# Patient Record
Sex: FEMALE | URBAN - METROPOLITAN AREA
[De-identification: names, ages, dates, MRNs, and addresses within clinical notes are randomized per-mention and may not be internally consistent; named-entity substitution may affect disease eponyms.]

---

## 2022-12-03 ENCOUNTER — OFFICE VISIT (OUTPATIENT)
Dept: URBAN - METROPOLITAN AREA CLINIC 3 | Facility: CLINIC | Age: 42
End: 2022-12-03
Payer: COMMERCIAL

## 2022-12-03 DIAGNOSIS — H18.623 KERATOCONUS, UNSTABLE, BILATERAL: Primary | ICD-10-CM

## 2022-12-03 DIAGNOSIS — H43.313 VITREOUS MEMBRANES AND STRANDS, BILATERAL: ICD-10-CM

## 2022-12-03 DIAGNOSIS — H11.153 PINGUECULA, BILATERAL: ICD-10-CM

## 2022-12-03 PROCEDURE — 92025 CPTRIZED CORNEAL TOPOGRAPHY: CPT | Performed by: OPTOMETRIST

## 2022-12-03 PROCEDURE — 99204 OFFICE O/P NEW MOD 45 MIN: CPT | Performed by: OPTOMETRIST

## 2022-12-03 ASSESSMENT — INTRAOCULAR PRESSURE
OS: 3
OD: 5

## 2022-12-03 NOTE — IMPRESSION/PLAN
Impression: Keratoconus, unstable, bilateral: J21.729. Plan: Confirmed with Pentacam , which shows posterior steepening Advised no rubbing. Refer for corneal crosslinking and repeat Pentacam and proceed with CXL if there is any progression.

## 2022-12-21 ENCOUNTER — OFFICE VISIT (OUTPATIENT)
Dept: URBAN - METROPOLITAN AREA CLINIC 3 | Facility: CLINIC | Age: 42
End: 2022-12-21
Payer: COMMERCIAL

## 2022-12-21 DIAGNOSIS — H43.313 VITREOUS MEMBRANES AND STRANDS, BILATERAL: ICD-10-CM

## 2022-12-21 DIAGNOSIS — H18.623 KERATOCONUS, UNSTABLE, BILATERAL: Primary | ICD-10-CM

## 2022-12-21 PROCEDURE — 92014 COMPRE OPH EXAM EST PT 1/>: CPT | Performed by: OPHTHALMOLOGY

## 2022-12-21 PROCEDURE — 92025 CPTRIZED CORNEAL TOPOGRAPHY: CPT | Performed by: OPHTHALMOLOGY

## 2022-12-21 ASSESSMENT — INTRAOCULAR PRESSURE
OD: 5
OS: 4

## 2022-12-21 NOTE — IMPRESSION/PLAN
Impression: Keratoconus, unstable, bilateral: M00.357. Plan: Severe Keratoconus RT>LT- Confirmed with repeat pentacam which shows severe posterior steepening and corneal thinning  that is worse in the right eye. Recommended a formal evaluation for Intact/Epi-on CXL vs. corneal transplant. Gave referral to Dr. Vinod Joseph. If unable to see him can RTC and request an appointment with Dr. Marcy Bueno.